# Patient Record
Sex: MALE | Race: BLACK OR AFRICAN AMERICAN | NOT HISPANIC OR LATINO | URBAN - METROPOLITAN AREA
[De-identification: names, ages, dates, MRNs, and addresses within clinical notes are randomized per-mention and may not be internally consistent; named-entity substitution may affect disease eponyms.]

---

## 2020-04-02 ENCOUNTER — HOSPITAL ENCOUNTER (EMERGENCY)
Facility: HOSPITAL | Age: 30
Discharge: HOME | End: 2020-04-02
Attending: EMERGENCY MEDICINE
Payer: MEDICAID

## 2020-04-02 VITALS
HEIGHT: 70 IN | TEMPERATURE: 98 F | HEART RATE: 89 BPM | WEIGHT: 124.31 LBS | OXYGEN SATURATION: 100 % | SYSTOLIC BLOOD PRESSURE: 113 MMHG | BODY MASS INDEX: 17.8 KG/M2 | DIASTOLIC BLOOD PRESSURE: 71 MMHG | RESPIRATION RATE: 16 BRPM

## 2020-04-02 DIAGNOSIS — R11.2 NAUSEA & VOMITING: ICD-10-CM

## 2020-04-02 DIAGNOSIS — R00.0 TACHYCARDIA: ICD-10-CM

## 2020-04-02 DIAGNOSIS — R11.2 NON-INTRACTABLE VOMITING WITH NAUSEA, UNSPECIFIED VOMITING TYPE: ICD-10-CM

## 2020-04-02 DIAGNOSIS — B20 SYMPTOMATIC HIV INFECTION: ICD-10-CM

## 2020-04-02 DIAGNOSIS — R11.10 EMESIS: ICD-10-CM

## 2020-04-02 DIAGNOSIS — E86.0 DEHYDRATION: Primary | ICD-10-CM

## 2020-04-02 LAB
ALBUMIN SERPL BCP-MCNC: 5.4 G/DL (ref 3.5–5.2)
ALP SERPL-CCNC: 117 U/L (ref 55–135)
ALT SERPL W/O P-5'-P-CCNC: 17 U/L (ref 10–44)
ANION GAP SERPL CALC-SCNC: 17 MMOL/L (ref 8–16)
AST SERPL-CCNC: 17 U/L (ref 10–40)
BASOPHILS # BLD AUTO: 0.1 K/UL (ref 0–0.2)
BASOPHILS NFR BLD: 0.9 % (ref 0–1.9)
BILIRUB SERPL-MCNC: 1 MG/DL (ref 0.1–1)
BNP SERPL-MCNC: <10 PG/ML (ref 0–99)
BUN SERPL-MCNC: 49 MG/DL (ref 6–20)
CALCIUM SERPL-MCNC: 10.6 MG/DL (ref 8.7–10.5)
CHLORIDE SERPL-SCNC: 90 MMOL/L (ref 95–110)
CO2 SERPL-SCNC: 23 MMOL/L (ref 23–29)
CREAT SERPL-MCNC: 3.6 MG/DL (ref 0.5–1.4)
CRP SERPL-MCNC: 0.4 MG/L (ref 0–8.2)
DIFFERENTIAL METHOD: ABNORMAL
EOSINOPHIL # BLD AUTO: 1.3 K/UL (ref 0–0.5)
EOSINOPHIL NFR BLD: 11 % (ref 0–8)
ERYTHROCYTE [DISTWIDTH] IN BLOOD BY AUTOMATED COUNT: 18.1 % (ref 11.5–14.5)
EST. GFR  (AFRICAN AMERICAN): 25 ML/MIN/1.73 M^2
EST. GFR  (NON AFRICAN AMERICAN): 22 ML/MIN/1.73 M^2
GLUCOSE SERPL-MCNC: 114 MG/DL (ref 70–110)
HCT VFR BLD AUTO: 55.2 % (ref 40–54)
HGB BLD-MCNC: 18.5 G/DL (ref 14–18)
HIV 1+2 AB+HIV1 P24 AG SERPL QL IA: POSITIVE
IMM GRANULOCYTES # BLD AUTO: 0.07 K/UL (ref 0–0.04)
IMM GRANULOCYTES NFR BLD AUTO: 0.6 % (ref 0–0.5)
LIPASE SERPL-CCNC: 27 U/L (ref 4–60)
LYMPHOCYTES # BLD AUTO: 3.2 K/UL (ref 1–4.8)
LYMPHOCYTES NFR BLD: 27.6 % (ref 18–48)
MCH RBC QN AUTO: 27 PG (ref 27–31)
MCHC RBC AUTO-ENTMCNC: 33.5 G/DL (ref 32–36)
MCV RBC AUTO: 81 FL (ref 82–98)
MONOCYTES # BLD AUTO: 1.4 K/UL (ref 0.3–1)
MONOCYTES NFR BLD: 11.6 % (ref 4–15)
NEUTROPHILS # BLD AUTO: 5.6 K/UL (ref 1.8–7.7)
NEUTROPHILS NFR BLD: 48.3 % (ref 38–73)
NRBC BLD-RTO: 0 /100 WBC
PLATELET # BLD AUTO: 329 K/UL (ref 150–350)
PMV BLD AUTO: 12.3 FL (ref 9.2–12.9)
POTASSIUM SERPL-SCNC: 3.7 MMOL/L (ref 3.5–5.1)
PROT SERPL-MCNC: 9.5 G/DL (ref 6–8.4)
RBC # BLD AUTO: 6.84 M/UL (ref 4.6–6.2)
SODIUM SERPL-SCNC: 130 MMOL/L (ref 136–145)
TROPONIN I SERPL DL<=0.01 NG/ML-MCNC: 0.01 NG/ML (ref 0–0.03)
WBC # BLD AUTO: 11.69 K/UL (ref 3.9–12.7)

## 2020-04-02 PROCEDURE — 82728 ASSAY OF FERRITIN: CPT

## 2020-04-02 PROCEDURE — 83690 ASSAY OF LIPASE: CPT

## 2020-04-02 PROCEDURE — 86140 C-REACTIVE PROTEIN: CPT

## 2020-04-02 PROCEDURE — 80053 COMPREHEN METABOLIC PANEL: CPT

## 2020-04-02 PROCEDURE — 84484 ASSAY OF TROPONIN QUANT: CPT

## 2020-04-02 PROCEDURE — 99285 EMERGENCY DEPT VISIT HI MDM: CPT | Mod: 25

## 2020-04-02 PROCEDURE — 87389 HIV-1 AG W/HIV-1&-2 AB AG IA: CPT

## 2020-04-02 PROCEDURE — 83880 ASSAY OF NATRIURETIC PEPTIDE: CPT

## 2020-04-02 PROCEDURE — 63600175 PHARM REV CODE 636 W HCPCS: Performed by: EMERGENCY MEDICINE

## 2020-04-02 PROCEDURE — 93005 ELECTROCARDIOGRAM TRACING: CPT

## 2020-04-02 PROCEDURE — 85025 COMPLETE CBC W/AUTO DIFF WBC: CPT

## 2020-04-02 PROCEDURE — 93010 ELECTROCARDIOGRAM REPORT: CPT | Mod: ,,, | Performed by: INTERNAL MEDICINE

## 2020-04-02 PROCEDURE — 93010 EKG 12-LEAD: ICD-10-PCS | Mod: ,,, | Performed by: INTERNAL MEDICINE

## 2020-04-02 PROCEDURE — 96361 HYDRATE IV INFUSION ADD-ON: CPT

## 2020-04-02 PROCEDURE — 86703 HIV-1/HIV-2 1 RESULT ANTBDY: CPT

## 2020-04-02 PROCEDURE — 96360 HYDRATION IV INFUSION INIT: CPT

## 2020-04-02 RX ADMIN — SODIUM CHLORIDE 2000 ML: 0.9 INJECTION, SOLUTION INTRAVENOUS at 07:04

## 2020-04-02 NOTE — ED PROVIDER NOTES
Encounter Date: 4/2/2020      SCRIBE #2 NOTE: I, Nel Jung, am scribing for, and in the presence of, Margy Abbott MD. Other sections scribed: ED Discussion.     History     Chief Complaint   Patient presents with    Nausea     N/V, intermittent abd pain, and decreased appetite x 1 week     The history is provided by the patient.   Nausea   This is a recurrent problem. The current episode started more than 1 week ago. The problem occurs daily. The problem has not changed since onset.Associated symptoms include abdominal pain. Pertinent negatives include no chest pain, no headaches and no shortness of breath. The symptoms are aggravated by eating. Nothing relieves the symptoms. He has tried nothing for the symptoms.     Past Medical History:  History reviewed. No pertinent medical history.    Past Surgical History:  History reviewed. No pertinent surgical history.    Family History:  History reviewed. No pertinent family history.    Social History:  Social History Main Topics    Smoking status: Unknown if ever smoked    Smokeless tobacco: Unknown if ever used    Alcohol Use: Unknown drinking history    Drug Use: Unknown if ever used    Sexual Activity: Unknown     Review of Systems   Respiratory: Negative for shortness of breath.    Cardiovascular: Negative for chest pain.   Gastrointestinal: Positive for abdominal pain, nausea and vomiting.   Neurological: Negative for headaches.   All other systems reviewed and are negative.      Physical Exam     Initial Vitals [04/02/20 1809]   BP Pulse Resp Temp SpO2   115/60 (S) (!) 160 18 98.3 °F (36.8 °C) 99 %      MAP       --         Physical Exam    Nursing note and vitals reviewed.  Constitutional: He appears well-developed and well-nourished.   HENT:   Head: Normocephalic and atraumatic.   Mouth/Throat: Uvula is midline and oropharynx is clear and moist. Mucous membranes are dry.   Eyes: Conjunctivae and EOM are normal. Pupils are equal, round, and reactive to  light.   Neck: Normal range of motion. Neck supple.   Cardiovascular: Normal rate, regular rhythm and normal heart sounds.   Pulmonary/Chest: Breath sounds normal.   Abdominal: Soft. Bowel sounds are normal.   Musculoskeletal: Normal range of motion.   Neurological: He is alert and oriented to person, place, and time. He has normal strength.   Skin: Skin is warm and dry.   Psychiatric: He has a normal mood and affect. Thought content normal.         ED Course   Procedures  Labs Reviewed   CBC W/ AUTO DIFFERENTIAL - Abnormal; Notable for the following components:       Result Value    RBC 6.84 (*)     Hemoglobin 18.5 (*)     Hematocrit 55.2 (*)     Mean Corpuscular Volume 81 (*)     RDW 18.1 (*)     Immature Granulocytes 0.6 (*)     Immature Grans (Abs) 0.07 (*)     Mono # 1.4 (*)     Eos # 1.3 (*)     Eosinophil% 11.0 (*)     All other components within normal limits   HIV 1 / 2 ANTIBODY - Abnormal; Notable for the following components:    HIV 1/2 Ag/Ab Positive (*)     All other components within normal limits   COMPREHENSIVE METABOLIC PANEL - Abnormal; Notable for the following components:    Sodium 130 (*)     Chloride 90 (*)     Glucose 114 (*)     BUN, Bld 49 (*)     Creatinine 3.6 (*)     Calcium 10.6 (*)     Total Protein 9.5 (*)     Albumin 5.4 (*)     Anion Gap 17 (*)     eGFR if  25 (*)     eGFR if non  22 (*)     All other components within normal limits   B-TYPE NATRIURETIC PEPTIDE   TROPONIN I   LIPASE   C-REACTIVE PROTEIN   URINALYSIS, REFLEX TO URINE CULTURE   FERRITIN   HIV 1/2 SUPPLEMENTAL ASSAY          Imaging Results          X-Ray Abdomen Flat And Erect (Final result)  Result time 04/02/20 19:23:56    Final result by HAWA Evans Sr., MD (04/02/20 19:23:56)                 Impression:      1. The bowel gas pattern is normal in appearance.  2. There is an age-indeterminate fracture in the anterior aspect of the left 10th rib.  If additional imaging evaluation  is clinically indicated, recommend consideration of a nuclear medicine bone scan.      Electronically signed by: Aidan Evans MD  Date:    04/02/2020  Time:    19:23             Narrative:    EXAMINATION:  XR ABDOMEN FLAT AND ERECT    CLINICAL HISTORY:  Vomiting, unspecified    COMPARISON:  None    FINDINGS:  The bowel gas pattern is normal in appearance. There is no pneumoperitoneum.  There is an age-indeterminate fracture in the anterior aspect of the left 10th rib.                               X-Ray Chest 1 View (Final result)  Result time 04/02/20 18:57:05    Final result by Seferino Garay MD (04/02/20 18:57:05)                 Impression:      No acute radiographic abnormality in the chest.      Electronically signed by: Seferino Graay  Date:    04/02/2020  Time:    18:57             Narrative:    EXAMINATION:  XR CHEST 1 VIEW    CLINICAL HISTORY:  Vomiting, unspecified    TECHNIQUE:  Single frontal view of the chest was performed.    COMPARISON:  None    FINDINGS:  Cardiac leads project over the chest.  The cardiomediastinal silhouette is within normal limits.  Trachea is midline.  Lungs are symmetrically expanded.  No airspace consolidative opacity or effusion.  No pneumothorax.  The osseous structures are intact.                              The EKG was ordered, reviewed, and independently interpreted by the ED provider.  Interpretation time: 18:15  Rate: 151 BPM  Rhythm: sinus tachycardia  Interpretation: Right axis deviation. Pulmonary disease pattern. Junctional ST depression. No STEMI.       Medical Decision Making:   Clinical Tests:   Lab Tests: Ordered and Reviewed  Radiological Study: Ordered and Reviewed  Medical Tests: Reviewed and Ordered            Scribe Attestation:   Scribe #2: I performed the above scribed service and the documentation accurately describes the services I performed. I attest to the accuracy of the note.    Attending Attestation:           Physician Attestation for  Scribe:    Physician Attestation Statement for Scribe #2: I, Margy Abbott MD, reviewed documentation, as scribed by Nel Jung in my presence, and it is both accurate and complete. I also acknowledge and confirm the content of the note done by Galaibe #1.                            Clinical Impression:   8:00 PM: Dr. Hayes transfers care of pt to Dr. Abbott.    9:37 PM: Discussed with pt all pertinent ED information and results. Discussed pt dx and plan of tx. Gave pt all f/u and return to the ED instructions. All questions and concerns were addressed at this time. Pt expresses understanding of information and instructions, and is comfortable with plan to discharge. Pt is stable for discharge.    I discussed with patient and/or family/caretaker that evaluation in the ED does not suggest any emergent or life threatening medical conditions requiring immediate intervention beyond what was provided in the ED, and I believe patient is safe for discharge.  Regardless, an unremarkable evaluation in the ED does not preclude the development or presence of a serious of life threatening condition. As such, patient was instructed to return immediately for any worsening or change in current symptoms.          Disposition:   Disposition: Discharged  Condition: Stable     ED Disposition Condition    Discharge Stable        ED Prescriptions     None        Follow-up Information    None                         ICD-10-CM ICD-9-CM   1. Dehydration E86.0 276.51   2. Tachycardia R00.0 785.0   3. Nausea & vomiting R11.2 787.01   4. Emesis R11.10 787.03   5. Symptomatic HIV infection B20 042   6. Non-intractable vomiting with nausea, unspecified vomiting type R11.2 787.01                Margy Abbott MD  04/03/20 [id number]

## 2020-04-03 ENCOUNTER — TELEPHONE (OUTPATIENT)
Dept: EMERGENCY MEDICINE | Facility: HOSPITAL | Age: 30
End: 2020-04-03

## 2020-04-03 PROBLEM — B20 HIV (HUMAN IMMUNODEFICIENCY VIRUS INFECTION): Status: ACTIVE | Noted: 2020-04-03

## 2020-04-03 LAB
FERRITIN SERPL-MCNC: 366 NG/ML (ref 20–300)
HIV SUPPLEMENTAL ASSAY INTERPRETATION: ABNORMAL
HIV-1 RESULT: POSITIVE
HIV-2 RESULT: NEGATIVE

## 2020-04-03 NOTE — TELEPHONE ENCOUNTER
I educated the patient on safety and how not to transmit the HIV virus to others and how important it is for him to receive care as soon as possible following diagnosis.  He voiced understanding and stated that he would call this nurse back if any further assistance was needed.

## 2020-04-20 ENCOUNTER — TELEPHONE (OUTPATIENT)
Dept: EMERGENCY MEDICINE | Facility: HOSPITAL | Age: 30
End: 2020-04-20